# Patient Record
Sex: FEMALE | Race: OTHER | Employment: UNEMPLOYED | URBAN - METROPOLITAN AREA
[De-identification: names, ages, dates, MRNs, and addresses within clinical notes are randomized per-mention and may not be internally consistent; named-entity substitution may affect disease eponyms.]

---

## 2024-01-30 ENCOUNTER — OFFICE VISIT (OUTPATIENT)
Dept: OBGYN CLINIC | Age: 24
End: 2024-01-30
Payer: COMMERCIAL

## 2024-01-30 VITALS — DIASTOLIC BLOOD PRESSURE: 80 MMHG | WEIGHT: 144 LBS | SYSTOLIC BLOOD PRESSURE: 104 MMHG

## 2024-01-30 DIAGNOSIS — R93.5 ABNORMAL CT SCAN, PELVIS: ICD-10-CM

## 2024-01-30 DIAGNOSIS — R19.00 PELVIC MASS: ICD-10-CM

## 2024-01-30 DIAGNOSIS — R10.2 PELVIC PAIN: Primary | ICD-10-CM

## 2024-01-30 PROBLEM — D21.9 FIBROID: Status: ACTIVE | Noted: 2024-02-08

## 2024-01-30 PROCEDURE — G8421 BMI NOT CALCULATED: HCPCS | Performed by: OBSTETRICS & GYNECOLOGY

## 2024-01-30 PROCEDURE — 1036F TOBACCO NON-USER: CPT | Performed by: OBSTETRICS & GYNECOLOGY

## 2024-01-30 PROCEDURE — G8427 DOCREV CUR MEDS BY ELIG CLIN: HCPCS | Performed by: OBSTETRICS & GYNECOLOGY

## 2024-01-30 PROCEDURE — G8484 FLU IMMUNIZE NO ADMIN: HCPCS | Performed by: OBSTETRICS & GYNECOLOGY

## 2024-01-30 PROCEDURE — 76830 TRANSVAGINAL US NON-OB: CPT | Performed by: OBSTETRICS & GYNECOLOGY

## 2024-01-30 PROCEDURE — 99203 OFFICE O/P NEW LOW 30 MIN: CPT | Performed by: OBSTETRICS & GYNECOLOGY

## 2024-01-30 NOTE — PROGRESS NOTES
MARY Jackson is a 23 y.o. female seen for annual GYN exam.    Past Medical History, Past Surgical History, Family history, Social History, and Medications were all reviewed with the patient today and updated as necessary.     No current outpatient medications on file.     No current facility-administered medications for this visit.     Not on File  No past medical history on file.  No past surgical history on file.  No family history on file.   Social History     Tobacco Use    Smoking status: Not on file    Smokeless tobacco: Not on file   Substance Use Topics    Alcohol use: Not on file       Social History     Substance and Sexual Activity   Sexual Activity Not on file     OB History   No obstetric history on file.       Health Maintenance    Pap smear:      Review of Systems  General: Not Present- Chills, Fever, Fatigue, Insomnia, Hot flashes/Night sweats, Weight gain  Skin: Not Present- Bruising, Change in Wart/Mole, Excessive Sweating, Itching, Nail Changes, New Lesions, Rash, Skin Color Changes and Ulcer.  HEENT: Not Present- Headache, Blurred Vision, Double Vision, Glaucoma, Visual Disturbances, Hearing Loss, Ringing in the Ears, Vertigo, Nose Bleed, Bleeding Gums, Hoarseness and Sore Throat.  Neck: Not Present- Neck Pain and Neck Swelling.  Respiratory: Not Present- Cough, Difficulty Breathing and Difficulty Breathing on Exertion.  Breast: Not Present- Breast Mass, Breast Pain, Breast Swelling, Nipple Discharge, Nipple Pain, Recent Breast Size Changes and Skin Changes.  Cardiovascular: Not Present- Abnormal Blood Pressure, Chest Pain, Edema, Fainting / Blacking Out, Palpitations, Shortness of Breath and Swelling of Extremities.  Gastrointestinal: Not Present- Abdominal Pain, Abdominal Swelling, Bloating, Change in Bowel Habits, Constipation, Diarrhea, Difficulty Swallowing, Gets full quickly at meals, Nausea, Rectal Bleeding and Vomiting.  Female Genitourinary: Not Present- Dysmenorrhea, 
septations noted. The cyst measures 10.8/8.2/10.3cm.   There is blood flow noted to the ovarian tissue. Possible endometrioma vs dermoid vs mucinous cystadenoma.   Lt Ovary appears normal   No free fluid seen     I reviewed the images of the US done in my office and discussed with patient.  She lives in [place] where she was going to school but is currently staying with her sister who is a hospitalist - usually downtown.  She has to go back to [place] in 2 weeks and wants her sister to have the surgery here.  Discussed laparoscopy.  She wishes to proceed        Time:  I spent  30 minutes in preparing to see patient (including chart review and preparation), obtaining and/or reviewing additional medical history, performing a physical exam and evaluation, documenting clinical information in the electronic health record, independently interpreting results, communicating results to patient, family or caregiver, and/or coordinating care.          No follow-ups on file.       KASI ROUSE MD

## 2024-01-31 NOTE — PROGRESS NOTES
Preop Visit    Ms. Jack Jackson presents for a preop visit.  She is scheduled for a Laparoscopy Diagnostic on 02/08/2024. Her history, meds, and allergies were reviewed.  The procedure was reviewed in detail as well as the risks of bleeding, infection, DVT and potential surgical complications involving the bladder, ureters, colon or intestines.  Also the alternatives,  benefits, recovery and follow-up. Prevention of SSI discussed as indicated.  All of her questions were answered.    Discussed with her and her sister possibility of extensive endometriosis.  May not be able to completely excise all of it.  Discussed possibility of her having to remove the ovary but will try to maintain as much ovarian tissue as possible.  Discussed possibility of needing postoperative medical therapy for endometriosis.  Discussed possible risk of having to perform an open laparotomy    Exam:  Lungs CTAB  Heart RRR    See the hospital H&P as well as prior chart for details.  Prescriptions for ibuprofen and Roxicodone sent to the pharmacy.  
0

## 2024-02-01 ENCOUNTER — OFFICE VISIT (OUTPATIENT)
Dept: OBGYN CLINIC | Age: 24
End: 2024-02-01

## 2024-02-01 VITALS — WEIGHT: 142.7 LBS | BODY MASS INDEX: 23.78 KG/M2 | HEIGHT: 65 IN

## 2024-02-01 DIAGNOSIS — Z01.818 PRE-OP EXAMINATION: Primary | ICD-10-CM

## 2024-02-01 DIAGNOSIS — R10.2 PELVIC PAIN IN FEMALE: ICD-10-CM

## 2024-02-01 DIAGNOSIS — R19.00 PELVIC MASS: ICD-10-CM

## 2024-02-01 DIAGNOSIS — N83.8 OVARIAN MASS: ICD-10-CM

## 2024-02-01 DIAGNOSIS — R10.2 PELVIC PAIN: ICD-10-CM

## 2024-02-01 DIAGNOSIS — R93.5 ABNORMAL CT SCAN, PELVIS: ICD-10-CM

## 2024-02-01 RX ORDER — SODIUM CHLORIDE 0.9 % (FLUSH) 0.9 %
5-40 SYRINGE (ML) INJECTION PRN
Status: CANCELLED | OUTPATIENT
Start: 2024-02-01

## 2024-02-01 RX ORDER — SODIUM CHLORIDE 0.9 % (FLUSH) 0.9 %
5-40 SYRINGE (ML) INJECTION EVERY 12 HOURS SCHEDULED
Status: CANCELLED | OUTPATIENT
Start: 2024-02-01

## 2024-02-01 RX ORDER — IBUPROFEN 600 MG/1
600 TABLET ORAL 3 TIMES DAILY PRN
Qty: 30 TABLET | Refills: 0 | Status: SHIPPED | OUTPATIENT
Start: 2024-02-01

## 2024-02-01 RX ORDER — OXYCODONE HYDROCHLORIDE 5 MG/1
5 TABLET ORAL EVERY 6 HOURS PRN
Qty: 20 TABLET | Refills: 0 | Status: SHIPPED | OUTPATIENT
Start: 2024-02-01 | End: 2024-02-06

## 2024-02-01 RX ORDER — SODIUM CHLORIDE 9 MG/ML
INJECTION, SOLUTION INTRAVENOUS PRN
Status: CANCELLED | OUTPATIENT
Start: 2024-02-01

## 2024-02-01 NOTE — H&P
Gynecology History and Physical    Name: Jack Jackson MRN: [id number] [id number]: [id number]    YOB: 2000  Age: 23 y.o.  Sex: female       Subjective:      Chief complaint:  Right Ovarian cyst     Jack is a 23 y.o. female with a history of dysmenorrhea, right ovarian cyst , and pelvic pain.    Uterus appears normal   Endo = 3.8mm and appears normal   Rt Ovary has large complex debris filled cyst with several small septations noted. The cyst measures 10.8/8.2/10.3cm.   There is blood flow noted to the ovarian tissue. Possible endometrioma vs dermoid vs mucinous cystadenoma.   Lt Ovary appears normal   No free fluid seen     OB History          0    Para        Term                AB        Living             SAB        IAB        Ectopic        Molar        Multiple        Live Births                  No past medical history on file.  No past surgical history on file.  Social History     Occupational History    Not on file   Tobacco Use    Smoking status: Former     Current packs/day: 0.00     Types: Cigarettes    Smokeless tobacco: Never   Substance and Sexual Activity    Alcohol use: Yes    Drug use: Never    Sexual activity: Not Currently     Partners: Male     Birth control/protection: None     Family History   Problem Relation Age of Onset    No Known Problems Mother     No Known Problems Father         No Known Allergies  Prior to Admission medications    Medication Sig Start Date End Date Taking? Authorizing Provider   ibuprofen (ADVIL;MOTRIN) 600 MG tablet Take 1 tablet by mouth 3 times daily as needed for Pain 24   Markus Orlando MD   oxyCODONE (ROXICODONE) 5 MG immediate release tablet Take 1 tablet by mouth every 6 hours as needed for Pain for up to 5 days. Intended supply: 5 days. Take lowest dose possible to manage pain Max Daily Amount: 20 mg 24  Markus Orlando MD        Review of Systems: A comprehensive review of systems was negative except for that

## 2024-02-01 NOTE — H&P (VIEW-ONLY)
Gynecology History and Physical    Name: Jack Jackson MRN: [id number] SSN: [id number]    YOB: 2000  Age: 23 y.o.  Sex: female       Subjective:      Chief complaint:  Right Ovarian cyst     Jack is a 23 y.o. female with a history of dysmenorrhea, right ovarian cyst , and pelvic pain.    Uterus appears normal   Endo = 3.8mm and appears normal   Rt Ovary has large complex debris filled cyst with several small septations noted. The cyst measures 10.8/8.2/10.3cm.   There is blood flow noted to the ovarian tissue. Possible endometrioma vs dermoid vs mucinous cystadenoma.   Lt Ovary appears normal   No free fluid seen     OB History          0    Para        Term                AB        Living             SAB        IAB        Ectopic        Molar        Multiple        Live Births                  No past medical history on file.  No past surgical history on file.  Social History     Occupational History    Not on file   Tobacco Use    Smoking status: Former     Current packs/day: 0.00     Types: Cigarettes    Smokeless tobacco: Never   Substance and Sexual Activity    Alcohol use: Yes    Drug use: Never    Sexual activity: Not Currently     Partners: Male     Birth control/protection: None     Family History   Problem Relation Age of Onset    No Known Problems Mother     No Known Problems Father         No Known Allergies  Prior to Admission medications    Medication Sig Start Date End Date Taking? Authorizing Provider   ibuprofen (ADVIL;MOTRIN) 600 MG tablet Take 1 tablet by mouth 3 times daily as needed for Pain 24   Markus Orlando MD   oxyCODONE (ROXICODONE) 5 MG immediate release tablet Take 1 tablet by mouth every 6 hours as needed for Pain for up to 5 days. Intended supply: 5 days. Take lowest dose possible to manage pain Max Daily Amount: 20 mg 24  Markus Orlando MD        Review of Systems: A comprehensive review of systems was negative except for that

## 2024-02-07 ENCOUNTER — ANESTHESIA EVENT (OUTPATIENT)
Dept: SURGERY | Age: 24
End: 2024-02-07
Payer: COMMERCIAL

## 2024-02-07 NOTE — PERIOP NOTE
Patient verified name and .  Order for consent was found in EHR and matches case posting; patient verifies procedure.   Type 2 surgery, PAT phone assessment complete.  Orders were received.  Labs per surgeon: None  Labs per anesthesia protocol: Hgb to be drawn DOS    Patient answered medical/surgical history questions at their best of ability. All prior to admission medications documented in EPIC.  Patient instructed to take the following medications the day of surgery according to anesthesia guidelines with a small sip of water: None On the day before surgery please take 2 Tylenol in the morning and then again before bed. You may use either regular or extra strength.   Hold all vitamins 7 days prior to surgery and NSAIDS 5 days prior to surgery. Prescription meds to hold:Ibuprofen  Patient instructed on the following:    > Arrive at A Entrance, time of arrival to be called the day before by 1700  > NPO after midnight, unless otherwise indicated, including gum, mints, and ice chips  > Responsible adult must drive patient to the hospital, stay during surgery, and patient will need supervision 24 hours after anesthesia  > Use non moisturizing soap in shower the night before surgery and on the morning of surgery  > All piercings must be removed prior to arrival.    > Leave all valuables (money and jewelry) at home but bring insurance card and ID on DOS.     > Do not wear make-up, nail polish, lotions, cologne, perfumes, powders, or oil on skin. Artificial nails are not permitted.

## 2024-02-08 ENCOUNTER — HOSPITAL ENCOUNTER (INPATIENT)
Age: 24
LOS: 4 days | Discharge: HOME | DRG: 743 | End: 2024-02-12
Attending: OBSTETRICS & GYNECOLOGY | Admitting: OBSTETRICS & GYNECOLOGY
Payer: COMMERCIAL

## 2024-02-08 ENCOUNTER — PREP FOR PROCEDURE (OUTPATIENT)
Dept: OBGYN CLINIC | Age: 24
End: 2024-02-08

## 2024-02-08 ENCOUNTER — ANESTHESIA (OUTPATIENT)
Dept: SURGERY | Age: 24
End: 2024-02-08
Payer: COMMERCIAL

## 2024-02-08 DIAGNOSIS — R10.2 PELVIC PAIN IN FEMALE: ICD-10-CM

## 2024-02-08 DIAGNOSIS — D21.9 FIBROID: ICD-10-CM

## 2024-02-08 PROBLEM — R19.00 PELVIC MASS: Status: ACTIVE | Noted: 2024-02-08

## 2024-02-08 LAB
HCG UR QL: NEGATIVE
HGB BLD-MCNC: 13.8 G/DL (ref 11.7–15.4)

## 2024-02-08 PROCEDURE — 3600000014 HC SURGERY LEVEL 4 ADDTL 15MIN: Performed by: OBSTETRICS & GYNECOLOGY

## 2024-02-08 PROCEDURE — 81025 URINE PREGNANCY TEST: CPT

## 2024-02-08 PROCEDURE — 87077 CULTURE AEROBIC IDENTIFY: CPT

## 2024-02-08 PROCEDURE — 6360000002 HC RX W HCPCS: Performed by: REGISTERED NURSE

## 2024-02-08 PROCEDURE — 88305 TISSUE EXAM BY PATHOLOGIST: CPT

## 2024-02-08 PROCEDURE — 2709999900 HC NON-CHARGEABLE SUPPLY: Performed by: OBSTETRICS & GYNECOLOGY

## 2024-02-08 PROCEDURE — 2720000010 HC SURG SUPPLY STERILE: Performed by: OBSTETRICS & GYNECOLOGY

## 2024-02-08 PROCEDURE — 6370000000 HC RX 637 (ALT 250 FOR IP): Performed by: OBSTETRICS & GYNECOLOGY

## 2024-02-08 PROCEDURE — 6360000002 HC RX W HCPCS: Performed by: ANESTHESIOLOGY

## 2024-02-08 PROCEDURE — 3600000004 HC SURGERY LEVEL 4 BASE: Performed by: OBSTETRICS & GYNECOLOGY

## 2024-02-08 PROCEDURE — 7100000000 HC PACU RECOVERY - FIRST 15 MIN: Performed by: OBSTETRICS & GYNECOLOGY

## 2024-02-08 PROCEDURE — 0UT50ZZ RESECTION OF RIGHT FALLOPIAN TUBE, OPEN APPROACH: ICD-10-PCS | Performed by: OBSTETRICS & GYNECOLOGY

## 2024-02-08 PROCEDURE — 88302 TISSUE EXAM BY PATHOLOGIST: CPT

## 2024-02-08 PROCEDURE — 87184 SC STD DISK METHOD PER PLATE: CPT

## 2024-02-08 PROCEDURE — 0UJ34ZZ INSPECTION OF OVARY, PERCUTANEOUS ENDOSCOPIC APPROACH: ICD-10-PCS | Performed by: OBSTETRICS & GYNECOLOGY

## 2024-02-08 PROCEDURE — 2500000003 HC RX 250 WO HCPCS: Performed by: REGISTERED NURSE

## 2024-02-08 PROCEDURE — 2580000003 HC RX 258: Performed by: OBSTETRICS & GYNECOLOGY

## 2024-02-08 PROCEDURE — 6370000000 HC RX 637 (ALT 250 FOR IP): Performed by: ANESTHESIOLOGY

## 2024-02-08 PROCEDURE — 1100000000 HC RM PRIVATE

## 2024-02-08 PROCEDURE — 87075 CULTR BACTERIA EXCEPT BLOOD: CPT

## 2024-02-08 PROCEDURE — 7100000001 HC PACU RECOVERY - ADDTL 15 MIN: Performed by: OBSTETRICS & GYNECOLOGY

## 2024-02-08 PROCEDURE — 6360000002 HC RX W HCPCS: Performed by: OBSTETRICS & GYNECOLOGY

## 2024-02-08 PROCEDURE — 49205 PR EXC/DESTRUCTION OPEN ABDOMINAL TUMORS >10.0 CM: CPT | Performed by: OBSTETRICS & GYNECOLOGY

## 2024-02-08 PROCEDURE — 2580000003 HC RX 258: Performed by: ANESTHESIOLOGY

## 2024-02-08 PROCEDURE — 87205 SMEAR GRAM STAIN: CPT

## 2024-02-08 PROCEDURE — 85018 HEMOGLOBIN: CPT

## 2024-02-08 PROCEDURE — 3700000001 HC ADD 15 MINUTES (ANESTHESIA): Performed by: OBSTETRICS & GYNECOLOGY

## 2024-02-08 PROCEDURE — 87070 CULTURE OTHR SPECIMN AEROBIC: CPT

## 2024-02-08 PROCEDURE — 3700000000 HC ANESTHESIA ATTENDED CARE: Performed by: OBSTETRICS & GYNECOLOGY

## 2024-02-08 PROCEDURE — 0UT00ZZ RESECTION OF RIGHT OVARY, OPEN APPROACH: ICD-10-PCS | Performed by: OBSTETRICS & GYNECOLOGY

## 2024-02-08 PROCEDURE — 64488 TAP BLOCK BI INJECTION: CPT | Performed by: ANESTHESIOLOGY

## 2024-02-08 RX ORDER — ONDANSETRON 2 MG/ML
INJECTION INTRAMUSCULAR; INTRAVENOUS PRN
Status: DISCONTINUED | OUTPATIENT
Start: 2024-02-08 | End: 2024-02-08 | Stop reason: SDUPTHER

## 2024-02-08 RX ORDER — HYDROMORPHONE HYDROCHLORIDE 2 MG/ML
INJECTION, SOLUTION INTRAMUSCULAR; INTRAVENOUS; SUBCUTANEOUS PRN
Status: DISCONTINUED | OUTPATIENT
Start: 2024-02-08 | End: 2024-02-08 | Stop reason: SDUPTHER

## 2024-02-08 RX ORDER — ONDANSETRON 2 MG/ML
4 INJECTION INTRAMUSCULAR; INTRAVENOUS EVERY 6 HOURS PRN
Status: DISCONTINUED | OUTPATIENT
Start: 2024-02-08 | End: 2024-02-12 | Stop reason: HOSPADM

## 2024-02-08 RX ORDER — DEXAMETHASONE SODIUM PHOSPHATE 10 MG/ML
INJECTION INTRAMUSCULAR; INTRAVENOUS PRN
Status: DISCONTINUED | OUTPATIENT
Start: 2024-02-08 | End: 2024-02-08 | Stop reason: SDUPTHER

## 2024-02-08 RX ORDER — ROPIVACAINE HYDROCHLORIDE 5 MG/ML
INJECTION, SOLUTION EPIDURAL; INFILTRATION; PERINEURAL
Status: DISCONTINUED | OUTPATIENT
Start: 2024-02-08 | End: 2024-02-08 | Stop reason: SDUPTHER

## 2024-02-08 RX ORDER — OXYCODONE HYDROCHLORIDE 5 MG/1
10 TABLET ORAL EVERY 4 HOURS PRN
Status: DISCONTINUED | OUTPATIENT
Start: 2024-02-08 | End: 2024-02-12 | Stop reason: HOSPADM

## 2024-02-08 RX ORDER — SODIUM CHLORIDE 0.9 % (FLUSH) 0.9 %
5-40 SYRINGE (ML) INJECTION EVERY 12 HOURS SCHEDULED
Status: DISCONTINUED | OUTPATIENT
Start: 2024-02-08 | End: 2024-02-12 | Stop reason: HOSPADM

## 2024-02-08 RX ORDER — DIPHENHYDRAMINE HYDROCHLORIDE 50 MG/ML
12.5 INJECTION INTRAMUSCULAR; INTRAVENOUS
Status: DISCONTINUED | OUTPATIENT
Start: 2024-02-08 | End: 2024-02-08 | Stop reason: HOSPADM

## 2024-02-08 RX ORDER — LIDOCAINE HYDROCHLORIDE 20 MG/ML
INJECTION, SOLUTION EPIDURAL; INFILTRATION; INTRACAUDAL; PERINEURAL PRN
Status: DISCONTINUED | OUTPATIENT
Start: 2024-02-08 | End: 2024-02-08 | Stop reason: SDUPTHER

## 2024-02-08 RX ORDER — IBUPROFEN 800 MG/1
800 TABLET ORAL EVERY 8 HOURS SCHEDULED
Status: DISCONTINUED | OUTPATIENT
Start: 2024-02-09 | End: 2024-02-12 | Stop reason: HOSPADM

## 2024-02-08 RX ORDER — HYDROMORPHONE HYDROCHLORIDE 1 MG/ML
0.5 INJECTION, SOLUTION INTRAMUSCULAR; INTRAVENOUS; SUBCUTANEOUS EVERY 5 MIN PRN
Status: DISCONTINUED | OUTPATIENT
Start: 2024-02-08 | End: 2024-02-08 | Stop reason: HOSPADM

## 2024-02-08 RX ORDER — OXYCODONE HYDROCHLORIDE 5 MG/1
5 TABLET ORAL
Status: COMPLETED | OUTPATIENT
Start: 2024-02-08 | End: 2024-02-08

## 2024-02-08 RX ORDER — SODIUM CHLORIDE 9 MG/ML
INJECTION, SOLUTION INTRAVENOUS PRN
Status: DISCONTINUED | OUTPATIENT
Start: 2024-02-08 | End: 2024-02-12 | Stop reason: HOSPADM

## 2024-02-08 RX ORDER — MIDAZOLAM HYDROCHLORIDE 2 MG/2ML
2 INJECTION, SOLUTION INTRAMUSCULAR; INTRAVENOUS
Status: COMPLETED | OUTPATIENT
Start: 2024-02-08 | End: 2024-02-08

## 2024-02-08 RX ORDER — KETOROLAC TROMETHAMINE 30 MG/ML
30 INJECTION, SOLUTION INTRAMUSCULAR; INTRAVENOUS EVERY 6 HOURS
Status: COMPLETED | OUTPATIENT
Start: 2024-02-08 | End: 2024-02-09

## 2024-02-08 RX ORDER — SODIUM CHLORIDE 0.9 % (FLUSH) 0.9 %
5-40 SYRINGE (ML) INJECTION PRN
Status: DISCONTINUED | OUTPATIENT
Start: 2024-02-08 | End: 2024-02-08 | Stop reason: HOSPADM

## 2024-02-08 RX ORDER — GLYCOPYRROLATE 0.2 MG/ML
INJECTION INTRAMUSCULAR; INTRAVENOUS PRN
Status: DISCONTINUED | OUTPATIENT
Start: 2024-02-08 | End: 2024-02-08 | Stop reason: SDUPTHER

## 2024-02-08 RX ORDER — PROMETHAZINE HYDROCHLORIDE 25 MG/1
12.5 TABLET ORAL EVERY 6 HOURS PRN
Status: DISCONTINUED | OUTPATIENT
Start: 2024-02-08 | End: 2024-02-12 | Stop reason: HOSPADM

## 2024-02-08 RX ORDER — IBUPROFEN 800 MG/1
800 TABLET ORAL EVERY 8 HOURS SCHEDULED
Status: DISCONTINUED | OUTPATIENT
Start: 2024-02-08 | End: 2024-02-08

## 2024-02-08 RX ORDER — FENTANYL CITRATE 50 UG/ML
100 INJECTION, SOLUTION INTRAMUSCULAR; INTRAVENOUS
Status: DISCONTINUED | OUTPATIENT
Start: 2024-02-08 | End: 2024-02-08 | Stop reason: HOSPADM

## 2024-02-08 RX ORDER — BUPIVACAINE HYDROCHLORIDE 5 MG/ML
INJECTION, SOLUTION EPIDURAL; INTRACAUDAL PRN
Status: DISCONTINUED | OUTPATIENT
Start: 2024-02-08 | End: 2024-02-08 | Stop reason: ALTCHOICE

## 2024-02-08 RX ORDER — OXYCODONE HYDROCHLORIDE 5 MG/1
5 TABLET ORAL EVERY 4 HOURS PRN
Status: DISCONTINUED | OUTPATIENT
Start: 2024-02-08 | End: 2024-02-12 | Stop reason: HOSPADM

## 2024-02-08 RX ORDER — ONDANSETRON 2 MG/ML
4 INJECTION INTRAMUSCULAR; INTRAVENOUS
Status: COMPLETED | OUTPATIENT
Start: 2024-02-08 | End: 2024-02-08

## 2024-02-08 RX ORDER — SODIUM CHLORIDE 0.9 % (FLUSH) 0.9 %
5-40 SYRINGE (ML) INJECTION PRN
Status: DISCONTINUED | OUTPATIENT
Start: 2024-02-08 | End: 2024-02-12 | Stop reason: HOSPADM

## 2024-02-08 RX ORDER — ACETAMINOPHEN 500 MG
1000 TABLET ORAL ONCE
Status: COMPLETED | OUTPATIENT
Start: 2024-02-08 | End: 2024-02-08

## 2024-02-08 RX ORDER — DEXTROSE, SODIUM CHLORIDE, SODIUM LACTATE, POTASSIUM CHLORIDE, AND CALCIUM CHLORIDE 5; .6; .31; .03; .02 G/100ML; G/100ML; G/100ML; G/100ML; G/100ML
INJECTION, SOLUTION INTRAVENOUS CONTINUOUS
Status: DISCONTINUED | OUTPATIENT
Start: 2024-02-08 | End: 2024-02-09

## 2024-02-08 RX ORDER — MORPHINE SULFATE 4 MG/ML
4 INJECTION, SOLUTION INTRAMUSCULAR; INTRAVENOUS
Status: DISCONTINUED | OUTPATIENT
Start: 2024-02-08 | End: 2024-02-12 | Stop reason: HOSPADM

## 2024-02-08 RX ORDER — SODIUM CHLORIDE 0.9 % (FLUSH) 0.9 %
5-40 SYRINGE (ML) INJECTION EVERY 12 HOURS SCHEDULED
Status: DISCONTINUED | OUTPATIENT
Start: 2024-02-08 | End: 2024-02-08 | Stop reason: HOSPADM

## 2024-02-08 RX ORDER — KETAMINE HCL IN NACL, ISO-OSM 20 MG/2 ML
SYRINGE (ML) INJECTION PRN
Status: DISCONTINUED | OUTPATIENT
Start: 2024-02-08 | End: 2024-02-08 | Stop reason: SDUPTHER

## 2024-02-08 RX ORDER — SODIUM CHLORIDE 9 MG/ML
INJECTION, SOLUTION INTRAVENOUS PRN
Status: DISCONTINUED | OUTPATIENT
Start: 2024-02-08 | End: 2024-02-08 | Stop reason: HOSPADM

## 2024-02-08 RX ORDER — ACETAMINOPHEN 500 MG
1000 TABLET ORAL EVERY 8 HOURS PRN
Status: DISCONTINUED | OUTPATIENT
Start: 2024-02-08 | End: 2024-02-12 | Stop reason: HOSPADM

## 2024-02-08 RX ORDER — FENTANYL CITRATE 50 UG/ML
INJECTION, SOLUTION INTRAMUSCULAR; INTRAVENOUS PRN
Status: DISCONTINUED | OUTPATIENT
Start: 2024-02-08 | End: 2024-02-08 | Stop reason: SDUPTHER

## 2024-02-08 RX ORDER — NEOSTIGMINE METHYLSULFATE 1 MG/ML
INJECTION, SOLUTION INTRAVENOUS PRN
Status: DISCONTINUED | OUTPATIENT
Start: 2024-02-08 | End: 2024-02-08 | Stop reason: SDUPTHER

## 2024-02-08 RX ORDER — DEXAMETHASONE SODIUM PHOSPHATE 10 MG/ML
INJECTION, SOLUTION INTRAMUSCULAR; INTRAVENOUS
Status: DISCONTINUED | OUTPATIENT
Start: 2024-02-08 | End: 2024-02-08 | Stop reason: SDUPTHER

## 2024-02-08 RX ORDER — KETOROLAC TROMETHAMINE 30 MG/ML
INJECTION, SOLUTION INTRAMUSCULAR; INTRAVENOUS PRN
Status: DISCONTINUED | OUTPATIENT
Start: 2024-02-08 | End: 2024-02-08 | Stop reason: SDUPTHER

## 2024-02-08 RX ORDER — MORPHINE SULFATE 2 MG/ML
2 INJECTION, SOLUTION INTRAMUSCULAR; INTRAVENOUS
Status: DISCONTINUED | OUTPATIENT
Start: 2024-02-08 | End: 2024-02-12 | Stop reason: HOSPADM

## 2024-02-08 RX ORDER — ROCURONIUM BROMIDE 10 MG/ML
INJECTION, SOLUTION INTRAVENOUS PRN
Status: DISCONTINUED | OUTPATIENT
Start: 2024-02-08 | End: 2024-02-08 | Stop reason: SDUPTHER

## 2024-02-08 RX ORDER — PROPOFOL 10 MG/ML
INJECTION, EMULSION INTRAVENOUS PRN
Status: DISCONTINUED | OUTPATIENT
Start: 2024-02-08 | End: 2024-02-08 | Stop reason: SDUPTHER

## 2024-02-08 RX ORDER — SODIUM CHLORIDE, SODIUM LACTATE, POTASSIUM CHLORIDE, CALCIUM CHLORIDE 600; 310; 30; 20 MG/100ML; MG/100ML; MG/100ML; MG/100ML
INJECTION, SOLUTION INTRAVENOUS CONTINUOUS
Status: DISCONTINUED | OUTPATIENT
Start: 2024-02-08 | End: 2024-02-08 | Stop reason: HOSPADM

## 2024-02-08 RX ADMIN — DEXAMETHASONE SODIUM PHOSPHATE 10 MG: 10 INJECTION INTRAMUSCULAR; INTRAVENOUS at 07:14

## 2024-02-08 RX ADMIN — Medication 3 MG: at 09:39

## 2024-02-08 RX ADMIN — Medication 20 MG: at 07:22

## 2024-02-08 RX ADMIN — ONDANSETRON 4 MG: 2 INJECTION INTRAMUSCULAR; INTRAVENOUS at 07:14

## 2024-02-08 RX ADMIN — FENTANYL CITRATE 100 MCG: 50 INJECTION, SOLUTION INTRAMUSCULAR; INTRAVENOUS at 07:05

## 2024-02-08 RX ADMIN — HYDROMORPHONE HYDROCHLORIDE 1 MG: 2 INJECTION INTRAMUSCULAR; INTRAVENOUS; SUBCUTANEOUS at 09:57

## 2024-02-08 RX ADMIN — LIDOCAINE HYDROCHLORIDE 60 MG: 20 INJECTION, SOLUTION EPIDURAL; INFILTRATION; INTRACAUDAL; PERINEURAL at 07:08

## 2024-02-08 RX ADMIN — HYDROMORPHONE HYDROCHLORIDE 0.5 MG: 1 INJECTION, SOLUTION INTRAMUSCULAR; INTRAVENOUS; SUBCUTANEOUS at 10:16

## 2024-02-08 RX ADMIN — KETOROLAC TROMETHAMINE 30 MG: 30 INJECTION, SOLUTION INTRAMUSCULAR; INTRAVENOUS at 22:56

## 2024-02-08 RX ADMIN — SODIUM CHLORIDE, SODIUM LACTATE, POTASSIUM CHLORIDE, AND CALCIUM CHLORIDE: 600; 310; 30; 20 INJECTION, SOLUTION INTRAVENOUS at 08:05

## 2024-02-08 RX ADMIN — DEXAMETHASONE SODIUM PHOSPHATE 10 MG: 10 INJECTION, SOLUTION INTRAMUSCULAR; INTRAVENOUS at 09:41

## 2024-02-08 RX ADMIN — PROPOFOL 200 MG: 10 INJECTION, EMULSION INTRAVENOUS at 07:08

## 2024-02-08 RX ADMIN — ROCURONIUM BROMIDE 50 MG: 50 INJECTION, SOLUTION INTRAVENOUS at 07:08

## 2024-02-08 RX ADMIN — MIDAZOLAM HYDROCHLORIDE 2 MG: 1 INJECTION, SOLUTION INTRAMUSCULAR; INTRAVENOUS at 06:53

## 2024-02-08 RX ADMIN — Medication 2000 MG: at 07:12

## 2024-02-08 RX ADMIN — HYDROMORPHONE HYDROCHLORIDE 0.5 MG: 1 INJECTION, SOLUTION INTRAMUSCULAR; INTRAVENOUS; SUBCUTANEOUS at 10:11

## 2024-02-08 RX ADMIN — KETOROLAC TROMETHAMINE 30 MG: 30 INJECTION, SOLUTION INTRAMUSCULAR; INTRAVENOUS at 09:39

## 2024-02-08 RX ADMIN — SODIUM CHLORIDE, SODIUM LACTATE, POTASSIUM CHLORIDE, CALCIUM CHLORIDE AND DEXTROSE MONOHYDRATE: 5; 600; 310; 30; 20 INJECTION, SOLUTION INTRAVENOUS at 20:19

## 2024-02-08 RX ADMIN — ROPIVACAINE HYDROCHLORIDE 30 ML: 5 INJECTION, SOLUTION EPIDURAL; INFILTRATION; PERINEURAL at 09:41

## 2024-02-08 RX ADMIN — KETOROLAC TROMETHAMINE 30 MG: 30 INJECTION, SOLUTION INTRAMUSCULAR; INTRAVENOUS at 15:40

## 2024-02-08 RX ADMIN — HYDROMORPHONE HYDROCHLORIDE 1 MG: 2 INJECTION INTRAMUSCULAR; INTRAVENOUS; SUBCUTANEOUS at 08:03

## 2024-02-08 RX ADMIN — ONDANSETRON 4 MG: 2 INJECTION INTRAMUSCULAR; INTRAVENOUS at 10:30

## 2024-02-08 RX ADMIN — OXYCODONE 5 MG: 5 TABLET ORAL at 10:30

## 2024-02-08 RX ADMIN — ACETAMINOPHEN 1000 MG: 500 TABLET, FILM COATED ORAL at 06:26

## 2024-02-08 RX ADMIN — OXYCODONE 5 MG: 5 TABLET ORAL at 14:01

## 2024-02-08 RX ADMIN — CEFAZOLIN 2000 MG: 10 INJECTION, POWDER, FOR SOLUTION INTRAVENOUS at 12:30

## 2024-02-08 RX ADMIN — SODIUM CHLORIDE, SODIUM LACTATE, POTASSIUM CHLORIDE, CALCIUM CHLORIDE AND DEXTROSE MONOHYDRATE: 5; 600; 310; 30; 20 INJECTION, SOLUTION INTRAVENOUS at 11:31

## 2024-02-08 RX ADMIN — SODIUM CHLORIDE, SODIUM LACTATE, POTASSIUM CHLORIDE, AND CALCIUM CHLORIDE: 600; 310; 30; 20 INJECTION, SOLUTION INTRAVENOUS at 06:53

## 2024-02-08 RX ADMIN — OXYCODONE 5 MG: 5 TABLET ORAL at 20:22

## 2024-02-08 RX ADMIN — GLYCOPYRROLATE 0.4 MG: 0.2 INJECTION INTRAMUSCULAR; INTRAVENOUS at 09:39

## 2024-02-08 NOTE — PERIOP NOTE
MD Michelle at bedside with patient. Pt VSS stable. Pain and Nausea controlled at this time. Verbal sign out per MD when pacu care is completed. Plan of care continues.

## 2024-02-08 NOTE — PROGRESS NOTES
Gynecology Progress Note    Patient doing well post-op day 0 from laparotomy with right salpingo-oophorectomy of the pelvic mass without significant complaints.  Pain controlled on current medication.  Voiding without difficulty.     Vitals:  Blood pressure 124/72, pulse (!) 111, temperature 98 °F (36.7 °C), temperature source Oral, resp. rate 16, height 1.651 m (5' 5\"), weight 65.9 kg (145 lb 3.2 oz), last menstrual period 2024, SpO2 97 %, not currently breastfeeding.  Temp (24hrs), Av.3 °F (36.8 °C), Min:97.3 °F (36.3 °C), Max:99.3 °F (37.4 °C)        Exam:  Patient without distress.               Abdomen soft,                 Incision dressing dry and clean               Lower extremities are negative for swelling, cords, or tenderness.    Lab/Data Review:      Assessment and Plan:  Patient appears to be having uncomplicated post OP course.  Continue routine post-op care.  Discussed findings and events of the surgery.  All of her questions were answered.  Patient feels that she is doing well.

## 2024-02-08 NOTE — ANESTHESIA PRE PROCEDURE
Applicable):  No results found for: \"HIV\", \"HEPCAB\"    COVID-19 Screening (If Applicable): No results found for: \"COVID19\"        Anesthesia Evaluation  Patient summary reviewed and Nursing notes reviewed  Airway: Mallampati: II  TM distance: >3 FB   Neck ROM: full  Mouth opening: > = 3 FB   Dental: normal exam         Pulmonary:Negative Pulmonary ROS and normal exam  breath sounds clear to auscultation                             Cardiovascular:Negative CV ROS  Exercise tolerance: good (>4 METS)          Rhythm: regular  Rate: normal                    Neuro/Psych:   Negative Neuro/Psych ROS              GI/Hepatic/Renal: Neg GI/Hepatic/Renal ROS            Endo/Other: Negative Endo/Other ROS                    Abdominal:             Vascular: negative vascular ROS.         Other Findings:       Anesthesia Plan      general     ASA 2       Induction: intravenous.      Anesthetic plan and risks discussed with patient.                    Ld Rodrigues MD   2/8/2024

## 2024-02-08 NOTE — PERIOP NOTE
TRANSFER - OUT REPORT:    Verbal report given to XENA Gould on Jack Jackson  being transferred to [facility] for routine post-op       Report consisted of patient's Situation, Background, Assessment and   Recommendations(SBAR).     Information from the following report(s) Nurse Handoff Report and Cardiac Rhythm SR/ST  was reviewed with the receiving nurse.           Lines:   Peripheral IV 02/08/24 Posterior;Right Hand (Active)   Site Assessment Clean, dry & intact 02/08/24 0958   Line Status Infusing 02/08/24 0958   Phlebitis Assessment No symptoms 02/08/24 0958   Infiltration Assessment 0 02/08/24 0958   Alcohol Cap Used No 02/08/24 0958   Dressing Status Clean, dry & intact 02/08/24 0958   Dressing Type Transparent 02/08/24 0958        Opportunity for questions and clarification was provided.      Patient transported with:  O2 @ room air lpm

## 2024-02-08 NOTE — ANESTHESIA POSTPROCEDURE EVALUATION
Department of Anesthesiology  Postprocedure Note    Patient: Jack Jackson  MRN: [id number]  YOB: 2000  Date of evaluation: 2/8/2024    Procedure Summary       Date: 02/08/24 Room / Location: [facility] MAIN OR 02 / [facility] MAIN OR    Anesthesia Start: 0653 Anesthesia Stop: 1001    Procedure: ATTEMPTED LAPAROSCOPY DIAGNOSTIC/ RIGHT SALPINGECTOMY AND RESECTION OF PELVIC MASS (Right: Abdomen) Diagnosis:       Fibroid      Pelvic pain in female      (Fibroid [D21.9])      (Pelvic pain in female [R10.2])    Surgeons: Markus Orlando MD Responsible Provider: Ward Martinez IV, MD    Anesthesia Type: general ASA Status: 2            Anesthesia Type: No value filed.    Darleen Phase I: Darleen Score: 10    Darleen Phase II:      Anesthesia Post Evaluation    Patient location during evaluation: PACU  Patient participation: complete - patient participated  Level of consciousness: sleepy but conscious  Airway patency: patent  Nausea & Vomiting: no nausea and no vomiting  Cardiovascular status: hemodynamically stable  Respiratory status: acceptable, nonlabored ventilation and spontaneous ventilation  Hydration status: euvolemic  Comments: /77   Pulse (!) 111   Temp 99.3 °F (37.4 °C) (Oral)   Resp 16   Ht 1.651 m (5' 5\")   Wt 65.9 kg (145 lb 3.2 oz)   LMP 02/06/2024   SpO2 99%   BMI 24.16 kg/m²     Multimodal analgesia pain management approach  Pain management: adequate and satisfactory to patient        No notable events documented.

## 2024-02-08 NOTE — ANESTHESIA PROCEDURE NOTES
Peripheral Block    Patient location during procedure: OR  Reason for block: post-op pain management and at surgeon's request  Start time: 2/8/2024 9:41 AM  End time: 2/8/2024 9:46 AM  Staffing  Performed: anesthesiologist   Anesthesiologist: Ward Martinez IV, MD  Performed by: Ward Martinez IV, MD  Authorized by: Ward Martinez IV, MD    Preanesthetic Checklist  Completed: patient identified, pre-op evaluation, timeout performed and monitors applied/VS acknowledged  Peripheral Block   Patient position: supine  Prep: ChloraPrep  Provider prep: mask  Block type: TAP  Laterality: bilateral  Injection technique: single-shot  Guidance: ultrasound guided    Needle   Needle type: insulated echogenic nerve stimulator needle   Needle gauge: 20 G  Needle localization: ultrasound guidance  Needle length: 10 cm  Assessment   Injection assessment: negative aspiration for heme, local visualized surrounding nerve on ultrasound and no intravascular symptoms  Slow fractionated injection: yes  Hemodynamics: stable  Real-time US image taken/store: yes  Outcomes: uncomplicated and patient tolerated procedure well    Additional Notes  Each side with 15ml NS and 5mg decadron diluted to 30ml with NS.  Medications Administered  dexAMETHasone (DECADRON) (PF) 10 mg/mL injection - Other   10 mg - 2/8/2024 9:41:00 AM  ropivacaine (NAROPIN) injection 0.5% - Perineural   30 mL - 2/8/2024 9:41:00 AM

## 2024-02-08 NOTE — DISCHARGE INSTRUCTIONS
INSTRUCTIONS FOLLOWING GYN LAPAROSCOPY    ACTIVITY   Limit activity today; increase activity tomorrow, but no vigorous exercise   Shower only; no tub baths   No douches, tampons or intercourse until your doctor releases you (at least 2 weeks)   May return to work or school as directed by your doctor    DIET   Clear liquids until no nausea or vomiting   Advance to regular diet as tolerated    PAIN   Expect a moderate amount of pain.   Take pain medication as directed by your doctor. If no prescription for pain is sent home with you, take the appropriate dose of your commonly used pain medication.   Call you doctor if pain is NOT relieved by medication.   DO NOT take aspirin or blood thinners until directed by your doctor.    You will probably have bloody discharge (like a period) for 1-2 days, then watery discharge for another 7 days.    You will want to use a perineal pad, not tampons for this.      DRESSING CARE   Change dressing / band aids as directed by your doctor. You will experience bleeding similar to a period for the next couple of days followed by watery discharge up to seven more days.    FOLLOW PHONE CALLS  * Calls will be made by nursing staff.   If you have any problems or concerns, call your doctor as needed.    CALL YOUR DOCTOR IF   Excessive bleeding that does not stop after holding mild pressure over the area for 15 minutes   You soak a pad in an hour   Temperature of 101°F or above   Green or yellow, smelly drainage or discharge   You are unable to urinate by bedtime   Nausea and vomiting that does not stop by bedtime    MEDICATION INTERACTION:    During your procedure you potentially received a medication or medications which may reduce the effectiveness of oral contraceptives. Please consider other forms of contraception for 1 month following your procedure if you are currently using oral contraceptives as your primary form of birth control. In addition to this, we recommend continuing your oral

## 2024-02-08 NOTE — OP NOTE
Operative Note      Patient: Jack Jackson  YOB: 2000  MRN: [id number]    Date of Procedure: 2/8/2024    Pre-Op Diagnosis Codes:     * Fibroid [D21.9]     * Pelvic pain in female [R10.2]  Right ovarian cystic mass    Post-Op Diagnosis: Pelvic mass       Procedure(s):  ATTEMPTED LAPAROSCOPY DIAGNOSTIC/ RIGHT SALPINGECTOMY AND RESECTION OF PELVIC MASS    Surgeon(s):  Markus Orlando MD Berlet-Dach, Stephanie, MD    Assistant:   Dr William Thomas's assistance was required due to the complex nature of the case.  Assistant positioned the uterus and pelvic mass.  Applied counter traction for safe resection of pelvic mass      Anesthesia: General    Estimated Blood Loss (mL): 600    Complications: None    Specimens:   ID Type Source Tests Collected by Time Destination   1 : UTERINE CAVITY Swab Uterus CULTURE, ANAEROBIC, CULTURE, WOUND Markus Orlando MD 2/8/2024 0835    2 : CONTENTS OF UTERINE CAVITY Swab Uterus CULTURE, ANAEROBIC, CULTURE, WOUND Markus Orlando MD 2/8/2024 0837    A : RIGHT FALLOPIAN TUBE AND PELVIC MASS Tissue Fallopian Tube SURGICAL PATHOLOGY Markus Orlando MD 2/8/2024 0851        Implants:  * No implants in log *      Drains:   [REMOVED] Urinary Catheter 02/08/24 2 Way;Quinteros (Removed)   Catheter Indications Perioperative use for selected surgical procedures 02/08/24 1416   Site Assessment No urethral drainage 02/08/24 1041   Urine Color Yellow 02/08/24 1416   Urine Appearance Clear 02/08/24 1416   Collection Container Standard 02/08/24 1416   Securement Method Securing device (Describe) 02/08/24 1416   Catheter Best Practices  Drainage tube clipped to bed;Catheter secured to thigh;Tamper seal intact;Bag below bladder;Bag not on floor;Lack of dependent loop in tubing;Drainage bag less than half full 02/08/24 1416   Status Draining;Patent 02/08/24 1416   Output (mL) 700 mL 02/08/24 1416       Findings: Enlarged approximately 15 cm right pelvic mass involving the tube and ovary that

## 2024-02-08 NOTE — PLAN OF CARE
Problem: Pain  Goal: Verbalizes/displays adequate comfort level or baseline comfort level  2/8/2024 1515 by Bryanna Carroll, RN  Outcome: Progressing  2/8/2024 1514 by Bryanna Carroll, RN  Outcome: Progressing     Problem: Discharge Planning  Goal: Discharge to home or other facility with appropriate resources  2/8/2024 1515 by Bryanna Carroll, RN  Outcome: Progressing  2/8/2024 1514 by Bryanna Carroll, RN  Outcome: Progressing     Problem: ABCDS Injury Assessment  Goal: Absence of physical injury  2/8/2024 1515 by Bryanna Carroll, RN  Outcome: Progressing  2/8/2024 1514 by Bryanna Carroll, RN  Outcome: Progressing

## 2024-02-08 NOTE — BRIEF OP NOTE
Brief Postoperative Note      Patient: Jack Jackson  YOB: 2000  MRN: [id number]    Date of Procedure: 2/8/2024    Pre-Op Diagnosis Codes:     * Fibroid [D21.9]     * Pelvic pain in female [R10.2]    Post-Op Diagnosis: Same and pelvic mass       Procedure(s):  ATTEMPTED LAPAROSCOPY DIAGNOSTIC/ RIGHT SALPINGECTOMY AND RESECTION OF PELVIC MASS Laparotomy    Surgeon(s):  Markus Orlando MD Berlet-Dach, Stephanie, MD    Assistant:  Dr William Thomas's assistance was required due to the complex nature of the case.  Assistant positioned the uterus and pelvic mass.  Applied counter traction for safe resection of pelvic mass     Anesthesia: General    Estimated Blood Loss (mL): 600    Complications: None    Specimens:   ID Type Source Tests Collected by Time Destination   1 : UTERINE CAVITY Swab Uterus CULTURE, ANAEROBIC, CULTURE, WOUND Markus Orlando MD 2/8/2024 0835    2 : CONTENTS OF UTERINE CAVITY Swab Uterus CULTURE, ANAEROBIC, CULTURE, WOUND Markus Orlando MD 2/8/2024 0837    A : RIGHT FALLOPIAN TUBE AND PELVIC MASS Tissue Fallopian Tube SURGICAL PATHOLOGY Markus Orlando MD 2/8/2024 0851        Implants:  * No implants in log *      Drains:   Urinary Catheter 02/08/24 2 Way;Quinteros (Active)       Findings: Right pelvic mass involving posterior fundal uterus and Right tube and ovary      Electronically signed by Markus Orlando MD on 2/8/2024 at 9:58 AM

## 2024-02-08 NOTE — INTERVAL H&P NOTE
Update History & Physical    The patient's History and Physical of February 1, 2024 was reviewed with the patient and I examined the patient. There was no change. The surgical site was confirmed by the patient and me.     Plan: The risks, benefits, expected outcome, and alternative to the recommended procedure have been discussed with the patient. Patient understands and wants to proceed with the procedure.     Electronically signed by Markus Orlando MD on 2/8/2024 at 7:04 AM

## 2024-02-09 LAB
CREAT SERPL-MCNC: 0.78 MG/DL (ref 0.6–1)
ERYTHROCYTE [DISTWIDTH] IN BLOOD BY AUTOMATED COUNT: 13.5 % (ref 11.9–14.6)
ERYTHROCYTE [DISTWIDTH] IN BLOOD BY AUTOMATED COUNT: 13.6 % (ref 11.9–14.6)
HCT VFR BLD AUTO: 29.4 % (ref 35.8–46.3)
HCT VFR BLD AUTO: 30.6 % (ref 35.8–46.3)
HGB BLD-MCNC: 10.2 G/DL (ref 11.7–15.4)
HGB BLD-MCNC: 9.7 G/DL (ref 11.7–15.4)
LACTATE SERPL-SCNC: 1 MMOL/L (ref 0.4–2)
MCH RBC QN AUTO: 27.9 PG (ref 26.1–32.9)
MCH RBC QN AUTO: 28 PG (ref 26.1–32.9)
MCHC RBC AUTO-ENTMCNC: 33 G/DL (ref 31.4–35)
MCHC RBC AUTO-ENTMCNC: 33.3 G/DL (ref 31.4–35)
MCV RBC AUTO: 83.8 FL (ref 82–102)
MCV RBC AUTO: 84.7 FL (ref 82–102)
NRBC # BLD: 0 K/UL (ref 0–0.2)
NRBC # BLD: 0 K/UL (ref 0–0.2)
PLATELET # BLD AUTO: 166 K/UL (ref 150–450)
PLATELET # BLD AUTO: 178 K/UL (ref 150–450)
PMV BLD AUTO: 10 FL (ref 9.4–12.3)
PMV BLD AUTO: 10.1 FL (ref 9.4–12.3)
RBC # BLD AUTO: 3.47 M/UL (ref 4.05–5.2)
RBC # BLD AUTO: 3.65 M/UL (ref 4.05–5.2)
WBC # BLD AUTO: 21.3 K/UL (ref 4.3–11.1)
WBC # BLD AUTO: 23.6 K/UL (ref 4.3–11.1)

## 2024-02-09 PROCEDURE — 83605 ASSAY OF LACTIC ACID: CPT

## 2024-02-09 PROCEDURE — 6360000002 HC RX W HCPCS: Performed by: OBSTETRICS & GYNECOLOGY

## 2024-02-09 PROCEDURE — 1100000000 HC RM PRIVATE

## 2024-02-09 PROCEDURE — 2580000003 HC RX 258: Performed by: OBSTETRICS & GYNECOLOGY

## 2024-02-09 PROCEDURE — 82565 ASSAY OF CREATININE: CPT

## 2024-02-09 PROCEDURE — 6370000000 HC RX 637 (ALT 250 FOR IP): Performed by: OBSTETRICS & GYNECOLOGY

## 2024-02-09 PROCEDURE — 36415 COLL VENOUS BLD VENIPUNCTURE: CPT

## 2024-02-09 PROCEDURE — 85027 COMPLETE CBC AUTOMATED: CPT

## 2024-02-09 PROCEDURE — 87040 BLOOD CULTURE FOR BACTERIA: CPT

## 2024-02-09 RX ORDER — CLINDAMYCIN PHOSPHATE 900 MG/50ML
900 INJECTION, SOLUTION INTRAVENOUS EVERY 8 HOURS
Status: DISCONTINUED | OUTPATIENT
Start: 2024-02-09 | End: 2024-02-09 | Stop reason: RX

## 2024-02-09 RX ORDER — CLINDAMYCIN PHOSPHATE 900 MG/50ML
900 INJECTION, SOLUTION INTRAVENOUS EVERY 8 HOURS
Status: DISCONTINUED | OUTPATIENT
Start: 2024-02-09 | End: 2024-02-09 | Stop reason: SDUPTHER

## 2024-02-09 RX ORDER — METRONIDAZOLE 500 MG/100ML
500 INJECTION, SOLUTION INTRAVENOUS EVERY 8 HOURS
Status: DISCONTINUED | OUTPATIENT
Start: 2024-02-09 | End: 2024-02-09

## 2024-02-09 RX ADMIN — CLINDAMYCIN PHOSPHATE 900 MG: 900 INJECTION, SOLUTION INTRAVENOUS at 23:09

## 2024-02-09 RX ADMIN — GENTAMICIN SULFATE 320 MG: 40 INJECTION, SOLUTION INTRAMUSCULAR; INTRAVENOUS at 21:46

## 2024-02-09 RX ADMIN — OXYCODONE 5 MG: 5 TABLET ORAL at 15:12

## 2024-02-09 RX ADMIN — SODIUM CHLORIDE, PRESERVATIVE FREE 10 ML: 5 INJECTION INTRAVENOUS at 20:08

## 2024-02-09 RX ADMIN — OXYCODONE 10 MG: 5 TABLET ORAL at 02:45

## 2024-02-09 RX ADMIN — AMPICILLIN SODIUM 2000 MG: 2 INJECTION, POWDER, FOR SOLUTION INTRAMUSCULAR; INTRAVENOUS at 20:10

## 2024-02-09 RX ADMIN — SODIUM CHLORIDE, PRESERVATIVE FREE 10 ML: 5 INJECTION INTRAVENOUS at 09:13

## 2024-02-09 RX ADMIN — IBUPROFEN 800 MG: 800 TABLET, FILM COATED ORAL at 22:45

## 2024-02-09 RX ADMIN — SODIUM CHLORIDE, SODIUM LACTATE, POTASSIUM CHLORIDE, CALCIUM CHLORIDE AND DEXTROSE MONOHYDRATE: 5; 600; 310; 30; 20 INJECTION, SOLUTION INTRAVENOUS at 02:50

## 2024-02-09 RX ADMIN — KETOROLAC TROMETHAMINE 30 MG: 30 INJECTION, SOLUTION INTRAMUSCULAR; INTRAVENOUS at 05:55

## 2024-02-09 RX ADMIN — OXYCODONE 10 MG: 5 TABLET ORAL at 21:19

## 2024-02-09 RX ADMIN — IBUPROFEN 800 MG: 800 TABLET, FILM COATED ORAL at 16:46

## 2024-02-09 RX ADMIN — ACETAMINOPHEN 1000 MG: 500 TABLET, FILM COATED ORAL at 19:25

## 2024-02-09 RX ADMIN — SODIUM CHLORIDE 50 ML: 9 INJECTION, SOLUTION INTRAVENOUS at 20:09

## 2024-02-09 RX ADMIN — KETOROLAC TROMETHAMINE 30 MG: 30 INJECTION, SOLUTION INTRAMUSCULAR; INTRAVENOUS at 09:12

## 2024-02-09 RX ADMIN — OXYCODONE 5 MG: 5 TABLET ORAL at 11:19

## 2024-02-09 NOTE — PROGRESS NOTES
Gynecology Progress Note    Patient doing well post-op day 1 without significant complaints.  Pain controlled on current medication.  Voiding without difficulty.     Vitals:  Blood pressure (!) 100/45, pulse (!) 118, temperature 98.8 °F (37.1 °C), temperature source Oral, resp. rate 16, height 1.651 m (5' 5\"), weight 65.9 kg (145 lb 3.2 oz), last menstrual period 2024, SpO2 97 %, not currently breastfeeding.  Temp (24hrs), Av.8 °F (37.1 °C), Min:98 °F (36.7 °C), Max:99.6 °F (37.6 °C)        Exam:  Patient without distress.               Abdomen soft,    appropriately tender               Incision dry and clean without erythema.               Lower extremities are negative for swelling, cords, or tenderness.    Lab/Data Review:  Lab Results   Component Value Date    WBC 23.6 (H) 2024    HGB 10.2 (L) 2024    HCT 30.6 (L) 2024    MCV 83.8 2024     2024         Assessment and Plan:  Patient appears to be having uncomplicated post OP course.  Continue routine post-op care.   Feel white count elevation due to inflammatory changes from her surgery.  No sign of infection.  Will increase activity today stop her IV fluids recheck CBC in the morning observe also for any other signs of infection.  Questions answered from patient and her sister.

## 2024-02-09 NOTE — CARE COORDINATION
CM met with pt at bedside for initial CM assessment. Pt admitted with pelvic mass. Pt alert and oriented x 4. Demographics, insurance, and PCP discussed and verified. Pt reported no PCP at this time with [place] after discharge. No PCP referral requested at this time. No needs anticipated at discharge. Pt independent with ADLs PTA.     CM to continue to follow.       02/09/24 1521   Service Assessment   Patient Orientation Alert and Oriented;Person;Place;Situation;Self   Cognition Alert   History Provided By Patient   Primary Caregiver Self   Accompanied By/Relationship n/a   Support Systems Family Members   Patient's Healthcare Decision Maker is: Legal Next of Kin   PCP Verified by CM No   Prior Functional Level Independent in ADLs/IADLs   Current Functional Level Independent in ADLs/IADLs   Can patient return to prior living arrangement Yes   Ability to make needs known: Good   Family able to assist with home care needs: Yes   Would you like for me to discuss the discharge plan with any other family members/significant others, and if so, who? No   Financial Resources Other (Comment)  ([facility])   Community Resources None   CM/SW Referral Other (see comment)  (n/a)   Social/Functional History   ADL Assistance Independent   Homemaking Assistance Independent   Homemaking Responsibilities Yes   Ambulation Assistance Independent   Transfer Assistance Independent   Occupation Unemployed   Discharge Planning   Type of Residence House   Current Services Prior To Admission None   Potential Assistance Needed N/A   DME Ordered? No   Potential Assistance Purchasing Medications No   Type of Home Care Services None   Patient expects to be discharged to: House   Condition of Participation: Discharge Planning   The Plan for Transition of Care is related to the following treatment goals: Pt to return home when medically stable.

## 2024-02-10 LAB
BACTERIA SPEC CULT: ABNORMAL
BACTERIA SPEC CULT: ABNORMAL
BASOPHILS # BLD: 0 K/UL (ref 0–0.2)
BASOPHILS NFR BLD: 0 % (ref 0–2)
DIFFERENTIAL METHOD BLD: ABNORMAL
EOSINOPHIL # BLD: 0 K/UL (ref 0–0.8)
EOSINOPHIL NFR BLD: 0 % (ref 0.5–7.8)
ERYTHROCYTE [DISTWIDTH] IN BLOOD BY AUTOMATED COUNT: 13.7 % (ref 11.9–14.6)
GENTAMICIN SERPL-MCNC: <0.2 UG/ML
GRAM STN SPEC: ABNORMAL
GRAM STN SPEC: ABNORMAL
HCT VFR BLD AUTO: 28 % (ref 35.8–46.3)
HGB BLD-MCNC: 9.3 G/DL (ref 11.7–15.4)
IMM GRANULOCYTES # BLD AUTO: 0.2 K/UL (ref 0–0.5)
IMM GRANULOCYTES NFR BLD AUTO: 1 % (ref 0–5)
LYMPHOCYTES # BLD: 1.5 K/UL (ref 0.5–4.6)
LYMPHOCYTES NFR BLD: 10 % (ref 13–44)
MCH RBC QN AUTO: 28.4 PG (ref 26.1–32.9)
MCHC RBC AUTO-ENTMCNC: 33.2 G/DL (ref 31.4–35)
MCV RBC AUTO: 85.4 FL (ref 82–102)
MONOCYTES # BLD: 0.9 K/UL (ref 0.1–1.3)
MONOCYTES NFR BLD: 6 % (ref 4–12)
NEUTS SEG # BLD: 12.8 K/UL (ref 1.7–8.2)
NEUTS SEG NFR BLD: 83 % (ref 43–78)
NRBC # BLD: 0 K/UL (ref 0–0.2)
PLATELET # BLD AUTO: 169 K/UL (ref 150–450)
PLATELET COMMENT: ADEQUATE
PMV BLD AUTO: 10.2 FL (ref 9.4–12.3)
RBC # BLD AUTO: 3.28 M/UL (ref 4.05–5.2)
RBC MORPH BLD: ABNORMAL
SERVICE CMNT-IMP: ABNORMAL
WBC # BLD AUTO: 15.4 K/UL (ref 4.3–11.1)
WBC MORPH BLD: ABNORMAL

## 2024-02-10 PROCEDURE — 2580000003 HC RX 258: Performed by: OBSTETRICS & GYNECOLOGY

## 2024-02-10 PROCEDURE — 80170 ASSAY OF GENTAMICIN: CPT

## 2024-02-10 PROCEDURE — 6370000000 HC RX 637 (ALT 250 FOR IP): Performed by: OBSTETRICS & GYNECOLOGY

## 2024-02-10 PROCEDURE — 1100000000 HC RM PRIVATE

## 2024-02-10 PROCEDURE — 85025 COMPLETE CBC W/AUTO DIFF WBC: CPT

## 2024-02-10 PROCEDURE — 6360000002 HC RX W HCPCS: Performed by: OBSTETRICS & GYNECOLOGY

## 2024-02-10 PROCEDURE — 36415 COLL VENOUS BLD VENIPUNCTURE: CPT

## 2024-02-10 RX ORDER — CLINDAMYCIN PHOSPHATE 900 MG/50ML
900 INJECTION, SOLUTION INTRAVENOUS EVERY 8 HOURS
Status: DISCONTINUED | OUTPATIENT
Start: 2024-02-10 | End: 2024-02-10

## 2024-02-10 RX ORDER — CLINDAMYCIN HYDROCHLORIDE 150 MG/1
450 CAPSULE ORAL EVERY 6 HOURS
Status: DISCONTINUED | OUTPATIENT
Start: 2024-02-10 | End: 2024-02-12 | Stop reason: HOSPADM

## 2024-02-10 RX ORDER — DOCUSATE SODIUM 100 MG/1
100 CAPSULE, LIQUID FILLED ORAL DAILY
Status: DISCONTINUED | OUTPATIENT
Start: 2024-02-10 | End: 2024-02-12 | Stop reason: HOSPADM

## 2024-02-10 RX ADMIN — OXYCODONE 5 MG: 5 TABLET ORAL at 19:32

## 2024-02-10 RX ADMIN — IBUPROFEN 800 MG: 800 TABLET, FILM COATED ORAL at 14:36

## 2024-02-10 RX ADMIN — AMPICILLIN SODIUM 2000 MG: 2 INJECTION, POWDER, FOR SOLUTION INTRAMUSCULAR; INTRAVENOUS at 02:46

## 2024-02-10 RX ADMIN — ONDANSETRON 4 MG: 2 INJECTION INTRAMUSCULAR; INTRAVENOUS at 07:41

## 2024-02-10 RX ADMIN — DOCUSATE SODIUM 100 MG: 100 CAPSULE, LIQUID FILLED ORAL at 14:36

## 2024-02-10 RX ADMIN — IBUPROFEN 800 MG: 800 TABLET, FILM COATED ORAL at 07:15

## 2024-02-10 RX ADMIN — WATER 1000 MG: 1 INJECTION INTRAMUSCULAR; INTRAVENOUS; SUBCUTANEOUS at 21:19

## 2024-02-10 RX ADMIN — CLINDAMYCIN PHOSPHATE 900 MG: 900 INJECTION, SOLUTION INTRAVENOUS at 07:48

## 2024-02-10 RX ADMIN — OXYCODONE 5 MG: 5 TABLET ORAL at 12:33

## 2024-02-10 RX ADMIN — CLINDAMYCIN HYDROCHLORIDE 450 MG: 150 CAPSULE ORAL at 15:48

## 2024-02-10 RX ADMIN — AMPICILLIN SODIUM 2000 MG: 2 INJECTION, POWDER, FOR SOLUTION INTRAMUSCULAR; INTRAVENOUS at 07:44

## 2024-02-10 RX ADMIN — CLINDAMYCIN HYDROCHLORIDE 450 MG: 150 CAPSULE ORAL at 22:30

## 2024-02-10 RX ADMIN — IBUPROFEN 800 MG: 800 TABLET, FILM COATED ORAL at 22:33

## 2024-02-10 RX ADMIN — SODIUM CHLORIDE, PRESERVATIVE FREE 10 ML: 5 INJECTION INTRAVENOUS at 21:33

## 2024-02-10 NOTE — PROGRESS NOTES
GENTAMICIN DAILY FOLLOW UP NOTE  [facility]   Pharmacy Pharmacokinetic Monitoring Service - GENTAMICIN    Consulting Provider: Darion   Indication: GYN Infection  Target levels: <0.2, Urban-Kashif Nomogram  Day of Therapy: 2  Additional Antimicrobials: Clinda + Ampicillin      Pertinent Laboratory Values:   Wt Readings from Last 1 Encounters:   02/08/24 65.9 kg (145 lb 3.2 oz)     Temp Readings from Last 1 Encounters:   02/10/24 98.4 °F (36.9 °C)     Recent Labs     02/09/24  0625 02/09/24  2007 02/10/24  0757   CREATININE  --  0.78  --    WBC 23.6* 21.3* 15.4*   LACACIDPL  --  1.0  --      Estimated Creatinine Clearance: 103 mL/min (based on SCr of 0.78 mg/dL).    Lab Results   Component Value Date/Time    GENTRANDOM <0.2 02/10/2024 07:57 AM         Assessment:  Date/Time Dose Concentration   2/10 0757 320mg IV q24h <0.2       Plan:  Dosing recommendations based on Urban-Kashif Nomogram  Continue gentamicin 320mg IV q24h  Renal labs as indicated   Gentamicin concentrations will be ordered as clinically appropriate   Pharmacy will continue to monitor patient and adjust therapy as indicated  Repeat random level after 48-72 hours    Thank you for the consult,    Jeffy Verma, PharmD, BCPS  2/10/2024

## 2024-02-10 NOTE — PROGRESS NOTES
Gynecology Progress Note    Patient doing well post-op day 2 without significant complaints.  Pain controlled on current medication.  Voiding without difficulty.   She is tolerating p.o. diet.  Is having no nausea.  Last evening had a spike in temperature to 102.9.  She is also tachycardic.  CBC showed white count of 21 which was actually decreased from the 23 earlier yesterday.  Blood cultures were obtained and she was started on triple antibiotics.  Since then she states she feels better.    Vitals:  Blood pressure 108/68, pulse 98, temperature 98.4 °F (36.9 °C), resp. rate 16, height 1.651 m (5' 5\"), weight 65.9 kg (145 lb 3.2 oz), last menstrual period 2024, SpO2 96 %, not currently breastfeeding.  Temp (24hrs), Av.4 °F (37.4 °C), Min:98.3 °F (36.8 °C), Max:102.9 °F (39.4 °C)        Exam:  Patient without distress.               Abdomen soft, appropriately tender no rebound nondistended.                 Incision dry and clean without erythema.               Lower extremities are negative for swelling, cords, or tenderness.    Lab/Data Review:  Lactate last evening was 1.0.  CBC from this morning is pending.  Culture from surgery is positive for gram-negative cocci bacilli identification is pending.  Blood cultures from last evening are so far negative    Assessment and Plan: With fever last evening and cultures coming back positive discussed with patient and her sister that this would be very consistent with the mass having been a TOA.  Discussed did not appear acute infection at the time of the surgery.  In discussing things she had no acute episode of symptoms consistent with prior PID that would have evolved into this but it does appear this was more of a chronic in nature.  She seems to be responding to the antibiotics appropriately.  Did discuss that a TOA of that size it was necessary to remove which we have done.  At this point she should respond to the IV antibiotics.  Will plan on keeping her

## 2024-02-10 NOTE — PROGRESS NOTES
Pharmacy Gentamicin Progress Note    [facility]   Pharmacy Pharmacokinetic Monitoring Service - Gentamicin     Jack Jackson is a 23 y.o. female starting on gentamicin therapy for gyn infection.   Pharmacy consulted for monitoring and adjustment by Dr Orlando.     Target Concentration:  Using Urban-Kashif Nomogram      Pertinent Laboratory Values:   Ht Readings from Last 1 Encounters:   02/08/24 1.651 m (5' 5\")      Wt Readings from Last 1 Encounters:   02/08/24 65.9 kg (145 lb 3.2 oz)     Temp Readings from Last 1 Encounters:   02/09/24 98.8 °F (37.1 °C) (Oral)     Recent Labs     02/09/24  0625   WBC 23.6*     CrCl cannot be calculated (No successful lab value found.).  sCr ordered, waiting for results      Plan:  Start Gentamicin at 320 mg q24h  Estimated levels on therapy:  Random levels per Urban-Kashif Nomogram   Renal labs as indicated.  Ordered stat sCr  Gentamicin concentration ordered for 2/10 @ 0800    Pharmacy will continue to monitor patient and adjust therapy as indicated      Thank you for the consult,    Noemi Spence, JacquelineD

## 2024-02-10 NOTE — PROGRESS NOTES
MD notified of HR/temp. New orders received at this time.    This RN called lab to notify of new stat orders.

## 2024-02-10 NOTE — PLAN OF CARE
Problem: Pain  Goal: Verbalizes/displays adequate comfort level or baseline comfort level  Outcome: Progressing     Problem: Discharge Planning  Goal: Discharge to home or other facility with appropriate resources  Outcome: Progressing  Flowsheets (Taken 2/9/2024 1928)  Discharge to home or other facility with appropriate resources: Identify discharge learning needs (meds, wound care, etc)     Problem: ABCDS Injury Assessment  Goal: Absence of physical injury  Outcome: Progressing

## 2024-02-11 LAB
ALBUMIN SERPL-MCNC: 2.4 G/DL (ref 3.5–5)
ALBUMIN/GLOB SERPL: 0.5 (ref 0.4–1.6)
ALP SERPL-CCNC: 130 U/L (ref 50–136)
ALT SERPL-CCNC: 32 U/L (ref 12–65)
ANION GAP SERPL CALC-SCNC: 4 MMOL/L (ref 2–11)
AST SERPL-CCNC: 23 U/L (ref 15–37)
BASOPHILS # BLD: 0 K/UL (ref 0–0.2)
BASOPHILS NFR BLD: 0 % (ref 0–2)
BILIRUB SERPL-MCNC: 0.3 MG/DL (ref 0.2–1.1)
BUN SERPL-MCNC: 6 MG/DL (ref 6–23)
CALCIUM SERPL-MCNC: 8.8 MG/DL (ref 8.3–10.4)
CHLORIDE SERPL-SCNC: 106 MMOL/L (ref 103–113)
CO2 SERPL-SCNC: 27 MMOL/L (ref 21–32)
CREAT SERPL-MCNC: 0.6 MG/DL (ref 0.6–1)
DIFFERENTIAL METHOD BLD: ABNORMAL
EOSINOPHIL # BLD: 0.1 K/UL (ref 0–0.8)
EOSINOPHIL NFR BLD: 1 % (ref 0.5–7.8)
ERYTHROCYTE [DISTWIDTH] IN BLOOD BY AUTOMATED COUNT: 13.5 % (ref 11.9–14.6)
GLOBULIN SER CALC-MCNC: 4.7 G/DL (ref 2.8–4.5)
GLUCOSE SERPL-MCNC: 101 MG/DL (ref 65–100)
HCT VFR BLD AUTO: 28.7 % (ref 35.8–46.3)
HGB BLD-MCNC: 9.5 G/DL (ref 11.7–15.4)
IMM GRANULOCYTES # BLD AUTO: 0.1 K/UL (ref 0–0.5)
IMM GRANULOCYTES NFR BLD AUTO: 1 % (ref 0–5)
LYMPHOCYTES # BLD: 1.6 K/UL (ref 0.5–4.6)
LYMPHOCYTES NFR BLD: 15 % (ref 13–44)
MCH RBC QN AUTO: 27.7 PG (ref 26.1–32.9)
MCHC RBC AUTO-ENTMCNC: 33.1 G/DL (ref 31.4–35)
MCV RBC AUTO: 83.7 FL (ref 82–102)
MONOCYTES # BLD: 0.7 K/UL (ref 0.1–1.3)
MONOCYTES NFR BLD: 6 % (ref 4–12)
NEUTS SEG # BLD: 8.2 K/UL (ref 1.7–8.2)
NEUTS SEG NFR BLD: 77 % (ref 43–78)
NRBC # BLD: 0 K/UL (ref 0–0.2)
PLATELET # BLD AUTO: 175 K/UL (ref 150–450)
PMV BLD AUTO: 10.1 FL (ref 9.4–12.3)
POTASSIUM SERPL-SCNC: 3.6 MMOL/L (ref 3.5–5.1)
PROT SERPL-MCNC: 7.1 G/DL (ref 6.3–8.2)
RBC # BLD AUTO: 3.43 M/UL (ref 4.05–5.2)
SODIUM SERPL-SCNC: 137 MMOL/L (ref 136–146)
WBC # BLD AUTO: 10.6 K/UL (ref 4.3–11.1)

## 2024-02-11 PROCEDURE — 2580000003 HC RX 258: Performed by: OBSTETRICS & GYNECOLOGY

## 2024-02-11 PROCEDURE — 6370000000 HC RX 637 (ALT 250 FOR IP): Performed by: OBSTETRICS & GYNECOLOGY

## 2024-02-11 PROCEDURE — 85025 COMPLETE CBC W/AUTO DIFF WBC: CPT

## 2024-02-11 PROCEDURE — 1100000000 HC RM PRIVATE

## 2024-02-11 PROCEDURE — 36415 COLL VENOUS BLD VENIPUNCTURE: CPT

## 2024-02-11 PROCEDURE — 6360000002 HC RX W HCPCS: Performed by: OBSTETRICS & GYNECOLOGY

## 2024-02-11 PROCEDURE — 80053 COMPREHEN METABOLIC PANEL: CPT

## 2024-02-11 RX ORDER — BISACODYL 10 MG
10 SUPPOSITORY, RECTAL RECTAL DAILY PRN
Status: DISCONTINUED | OUTPATIENT
Start: 2024-02-11 | End: 2024-02-12 | Stop reason: HOSPADM

## 2024-02-11 RX ORDER — POLYETHYLENE GLYCOL 3350 17 G/17G
17 POWDER, FOR SOLUTION ORAL DAILY
Status: DISCONTINUED | OUTPATIENT
Start: 2024-02-11 | End: 2024-02-12 | Stop reason: HOSPADM

## 2024-02-11 RX ADMIN — OXYCODONE 5 MG: 5 TABLET ORAL at 08:52

## 2024-02-11 RX ADMIN — CLINDAMYCIN HYDROCHLORIDE 450 MG: 150 CAPSULE ORAL at 08:50

## 2024-02-11 RX ADMIN — WATER 1000 MG: 1 INJECTION INTRAMUSCULAR; INTRAVENOUS; SUBCUTANEOUS at 21:02

## 2024-02-11 RX ADMIN — CLINDAMYCIN HYDROCHLORIDE 450 MG: 150 CAPSULE ORAL at 21:01

## 2024-02-11 RX ADMIN — SODIUM CHLORIDE, PRESERVATIVE FREE 10 ML: 5 INJECTION INTRAVENOUS at 21:11

## 2024-02-11 RX ADMIN — IBUPROFEN 800 MG: 800 TABLET, FILM COATED ORAL at 14:33

## 2024-02-11 RX ADMIN — IBUPROFEN 800 MG: 800 TABLET, FILM COATED ORAL at 06:29

## 2024-02-11 RX ADMIN — POLYETHYLENE GLYCOL 3350 17 G: 17 POWDER, FOR SOLUTION ORAL at 09:26

## 2024-02-11 RX ADMIN — OXYCODONE 5 MG: 5 TABLET ORAL at 17:12

## 2024-02-11 RX ADMIN — CLINDAMYCIN HYDROCHLORIDE 450 MG: 150 CAPSULE ORAL at 14:33

## 2024-02-11 RX ADMIN — CLINDAMYCIN HYDROCHLORIDE 450 MG: 150 CAPSULE ORAL at 04:21

## 2024-02-11 RX ADMIN — DOCUSATE SODIUM 100 MG: 100 CAPSULE, LIQUID FILLED ORAL at 08:50

## 2024-02-11 RX ADMIN — IBUPROFEN 800 MG: 800 TABLET, FILM COATED ORAL at 21:01

## 2024-02-11 NOTE — PROGRESS NOTES
Gynecology Progress Note    Patient doing well post-op day 3 from laparoscopy with laparotomy without significant complaints.  Pain controlled on current medication.  Voiding without difficulty. Patient is passing flatus.    Vitals:  Blood pressure 110/77, pulse 94, temperature 98.4 °F (36.9 °C), temperature source Oral, resp. rate 16, height 1.651 m (5' 5\"), weight 65.9 kg (145 lb 3.2 oz), last menstrual period 2024, SpO2 96 %, not currently breastfeeding.  Temp (24hrs), Av.6 °F (37 °C), Min:98.4 °F (36.9 °C), Max:98.8 °F (37.1 °C)        Exam:  Patient without distress.  Heart rrr   Lungs cta b&s                Abdomen soft,  nontender.                 Incision dry and clean without erythema.               Lower extremities are negative for swelling, cords, or tenderness.    Lab/Data Review:  Labs reviewed    Assessment and Plan:  pt with postop fever and temp 102.9 nearly 2 days ago.  Culture with h flu.  On rocephin and cleocin. Hopefully change to oral meds tomorrow and home. Spoke with her sister and updated her.  Pathology pending.

## 2024-02-12 VITALS
HEIGHT: 65 IN | OXYGEN SATURATION: 99 % | RESPIRATION RATE: 18 BRPM | BODY MASS INDEX: 24.19 KG/M2 | SYSTOLIC BLOOD PRESSURE: 107 MMHG | WEIGHT: 145.2 LBS | DIASTOLIC BLOOD PRESSURE: 72 MMHG | HEART RATE: 78 BPM | TEMPERATURE: 98.1 F

## 2024-02-12 LAB
AEROBE ID RESULT 1: ABNORMAL
BACTERIA ISLT: ABNORMAL
SPECIMEN SOURCE: ABNORMAL

## 2024-02-12 PROCEDURE — 6370000000 HC RX 637 (ALT 250 FOR IP): Performed by: OBSTETRICS & GYNECOLOGY

## 2024-02-12 PROCEDURE — 2580000003 HC RX 258: Performed by: OBSTETRICS & GYNECOLOGY

## 2024-02-12 RX ORDER — CEFDINIR 300 MG/1
300 CAPSULE ORAL 2 TIMES DAILY
Qty: 14 CAPSULE | Refills: 0 | Status: SHIPPED | OUTPATIENT
Start: 2024-02-12 | End: 2024-02-19

## 2024-02-12 RX ADMIN — SODIUM CHLORIDE, PRESERVATIVE FREE 10 ML: 5 INJECTION INTRAVENOUS at 08:12

## 2024-02-12 RX ADMIN — CLINDAMYCIN HYDROCHLORIDE 450 MG: 150 CAPSULE ORAL at 03:30

## 2024-02-12 RX ADMIN — CLINDAMYCIN HYDROCHLORIDE 450 MG: 150 CAPSULE ORAL at 08:11

## 2024-02-12 RX ADMIN — IBUPROFEN 800 MG: 800 TABLET, FILM COATED ORAL at 14:21

## 2024-02-12 RX ADMIN — IBUPROFEN 800 MG: 800 TABLET, FILM COATED ORAL at 05:48

## 2024-02-12 RX ADMIN — DOCUSATE SODIUM 100 MG: 100 CAPSULE, LIQUID FILLED ORAL at 08:11

## 2024-02-12 RX ADMIN — OXYCODONE 5 MG: 5 TABLET ORAL at 12:03

## 2024-02-12 RX ADMIN — CLINDAMYCIN HYDROCHLORIDE 450 MG: 150 CAPSULE ORAL at 14:21

## 2024-02-12 RX ADMIN — POLYETHYLENE GLYCOL 3350 17 G: 17 POWDER, FOR SOLUTION ORAL at 08:11

## 2024-02-12 RX ADMIN — OXYCODONE 5 MG: 5 TABLET ORAL at 03:34

## 2024-02-12 NOTE — CARE COORDINATION
Pt is for discharge home today with family and no needs/supportive care orders recieved for CM at this time.         02/12/24 0918   Service Assessment   Patient's Healthcare Decision Maker is: Legal Next of Kin   Social/Functional History   ADL Assistance Independent   Homemaking Assistance Independent   Homemaking Responsibilities Yes   Ambulation Assistance Independent   Transfer Assistance Independent   Occupation Unemployed   Discharge Planning   Patient expects to be discharged to:  --    Services At/After Discharge   Transition of Care Consult (CM Consult) N/A   Services At/After Discharge None   [place] Resource Information Provided? No   Mode of Transport at Discharge Other (see comment)  (family)   Confirm Follow Up Transport Family   Condition of Participation: Discharge Planning   The Plan for Transition of Care is related to the following treatment goals: Pt to return home with family.   The Patient and/or Patient Representative was provided with a Choice of Provider? Patient   The Patient and/Or Patient Representative agree with the Discharge Plan? Yes   Freedom of Choice list was provided with basic dialogue that supports the patient's individualized plan of care/goals, treatment preferences, and shares the quality data associated with the providers?  Yes

## 2024-02-12 NOTE — PLAN OF CARE
Problem: Pain  Goal: Verbalizes/displays adequate comfort level or baseline comfort level  Outcome: Progressing     Problem: Discharge Planning  Goal: Discharge to home or other facility with appropriate resources  Outcome: Progressing  Flowsheets (Taken 2/11/2024 2115)  Discharge to home or other facility with appropriate resources: Identify discharge learning needs (meds, wound care, etc)     Problem: ABCDS Injury Assessment  Goal: Absence of physical injury  Outcome: Progressing

## 2024-02-12 NOTE — DISCHARGE SUMMARY
Gynecology Discharge Summary     Name: Jack Jackson MRN: [id number]  SSN: [id number]    YOB: 2000  Age: 23 y.o.  Sex: female      Allergies: Patient has no known allergies.    Admit Date: 2/8/2024    Discharge Date: 2/12/2024      Admitting Physician: Markus Orlando MD     Discharge Physician: Markus Orlando MD     * Admission Diagnoses: Fibroid [D21.9]  Pelvic pain in female [R10.2]  Pelvic mass [R19.00]    * Discharge Diagnoses:      * Procedures: Laparotomy with right salpingo-oophorectomy    Consults: None    * Discharge Condition: Good    * Hospital Course:   Patient had attempted laparoscopy converted to laparotomy for large pelvic mass.  Procedure concluded right salpingo-oophorectomy.  Postop night 1 into 2 she spiked a fever to 102.9.  Blood cultures were negative white count was elevated.  She was started on broad-spectrum antibiotics.  The cultures that were taken from her pelvic mass at the time of the surgery came back haemophilus influenza.  Her antibiotics were adjusted for this.  She remained afebrile white count became normal.  She was discharged to home on p.o. antibiotics.  Pathology is pending.    * Discharge Disposition: Home    Discharge Medications:      Medication List        START taking these medications      cefdinir 300 MG capsule  Commonly known as: OMNICEF  Take 1 capsule by mouth 2 times daily for 7 days            CONTINUE taking these medications      ibuprofen 600 MG tablet  Commonly known as: ADVIL;MOTRIN  Take 1 tablet by mouth 3 times daily as needed for Pain               Where to Get Your Medications        These medications were sent to [id number] [address], [facility] - P [phone] - F [phone]  [address] [facility] [place], [address] [address]      Phone: [phone]   cefdinir 300 MG capsule          * Follow-up Care/Patient Instructions:  Activity: No sex, douching, or tampons for 6 weeks or as

## 2024-02-12 NOTE — PROGRESS NOTES
Gynecology Progress Note    Patient doing well post-op day 4 without significant complaints.  Pain controlled on current medication.  Voiding without difficulty. Patient is passing flatus.    Vitals:  Blood pressure 110/69, pulse 82, temperature 98.5 °F (36.9 °C), temperature source Oral, resp. rate 16, height 1.651 m (5' 5\"), weight 65.9 kg (145 lb 3.2 oz), last menstrual period 2024, SpO2 99 %, not currently breastfeeding.  Temp (24hrs), Av.5 °F (36.9 °C), Min:98.5 °F (36.9 °C), Max:98.5 °F (36.9 °C)        Exam:  Patient without distress.               Abdomen soft, appropriately tender.                 Incision dry and clean without erythema.               Lower extremities are negative for swelling, cords, or tenderness.    Lab/Data Review:  Culture came back as H. influenzae that is beta-lactamase positive    Assessment and Plan:  Patient appears to doing well.  Will plan on discharging her home today she has been afebrile for greater than 48 hours only had the 1 temperature spike.  Will send her home on 1 week of cefdinir.  She already has an appointment to see me this Thursday we will follow-up at that time.  Hopefully pathology will be back by that point.  She has prescription for pain medicine that she is already filled preoperatively

## 2024-02-12 NOTE — PROGRESS NOTES
Discharge instructions reviewed with Pt. Opportunity for questions and clarification given. IV removed and cath tip intact. Scripts sent to pharmacy. Education given to pt and pt was able to teach back. No needs at this time and pt waiting on ride to arrive.

## 2024-02-13 LAB
AMPICILLIN + SULBACTAM: NORMAL
AMPICILLIN: NORMAL
BACTERIA ISLT: ABNORMAL
BACTERIA SPEC CULT: ABNORMAL
BACTERIA SPEC CULT: ABNORMAL
CEFACLOR: NORMAL
CEFIXIME: NORMAL
CEFTRIAXONE: NORMAL
CEFUROXIME: NORMAL
CHLORAMPHENICOL: NORMAL
CLARITHROMYCIN: NORMAL
GRAM STN SPEC: ABNORMAL
GRAM STN SPEC: ABNORMAL
LEVOFLOXACIN: NORMAL
MEROPENEM: NORMAL
ORGANISM ID: ABNORMAL
ORGANISM ID: NORMAL
SERVICE CMNT-IMP: ABNORMAL
SPECIMEN SOURCE: ABNORMAL
SPECIMEN SOURCE: NORMAL
TETRACYCLINE: NORMAL
TRIMETHOPRIM + SULFAMETHOXAZOLE: NORMAL

## 2024-02-14 LAB
BACTERIA SPEC CULT: NORMAL
SERVICE CMNT-IMP: NORMAL

## 2024-02-15 ENCOUNTER — OFFICE VISIT (OUTPATIENT)
Dept: OBGYN CLINIC | Age: 24
End: 2024-02-15

## 2024-02-15 VITALS — HEIGHT: 65 IN | BODY MASS INDEX: 22.96 KG/M2 | WEIGHT: 137.8 LBS

## 2024-02-15 DIAGNOSIS — Z09 POSTOPERATIVE EXAMINATION: Primary | ICD-10-CM

## 2024-02-15 PROCEDURE — 99024 POSTOP FOLLOW-UP VISIT: CPT | Performed by: OBSTETRICS & GYNECOLOGY

## 2024-02-15 NOTE — PROGRESS NOTES
Jack presents for postop visit from Attempted Laparoscopy Diagnotic/ Right Salpingectomy and Resection of pelvic mass about 2 weeks ago.  Doing well postoperatively.without any bleeding.  Fever: No Voiding well: Yes.  Bowel movements OK: Yes.     Exam: A&OX3, NAD.  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 BENIGN FALLOPIAN TUBE     A/P.  Stable Post op condition.  Gradually increase activity. Resumption of sexual activity is not encouraged at this time.  Follow up 2 weeks.  She is interested in getting back home at approximately 4 weeks postop I do believe this will be reasonable would like to see her back before then.  Had extensive discussion with patient and her sister about the pathology report and that I had spoken with the pathologist who felt very sure that there was no concerning findings in the cyst walls.  I had spoken to the pathologist with giving him more clinical information and he felt that this was the best diagnosis that they would have.      Portions of this note were created using voice recognition software. Despite my efforts to edit grammatical and transcription errors, bizarre and nonsensical sentences may still exist.

## 2024-02-21 RX ORDER — GABAPENTIN 300 MG/1
300 CAPSULE ORAL 3 TIMES DAILY
Qty: 15 CAPSULE | Refills: 0 | Status: SHIPPED | OUTPATIENT
Start: 2024-02-21 | End: 2024-02-26

## 2024-02-22 NOTE — PROGRESS NOTES
Jack presents for postop visit from Attempted Laparoscopy Diagnotic/ Right Salpingectomy and Resection of pelvic mass about 3 weeks ago.  Doing well postoperatively.without any bleeding.  Fever: No Voiding well: Yes.  Bowel movements OK: Yes.     Taking Gabapentin once a day, symptoms have improved.     Exam: A&OX3, NAD.  Abdomen:  Non tender Incisions clean, dry, intact      A/P.  Stable Post op condition.  Gradually increase activity.   Follow up as needed.  She is moving back home and is interested in a refill of the gabapentin since she is concerned with increased activity and traveling this may be more of a problem.  I think it is reasonable to continue a short course if necessary.  I do believe she is healing well and will not need this long-term but she will follow-up with her primary care at home if she feels that is necessary.    Portions of this note were created using voice recognition software. Despite my efforts to edit grammatical and transcription errors, bizarre and nonsensical sentences may still exist.

## 2024-02-26 ENCOUNTER — OFFICE VISIT (OUTPATIENT)
Dept: OBGYN CLINIC | Age: 24
End: 2024-02-26

## 2024-02-26 VITALS — BODY MASS INDEX: 23.03 KG/M2 | WEIGHT: 138.2 LBS | HEIGHT: 65 IN

## 2024-02-26 DIAGNOSIS — Z09 POSTOPERATIVE EXAMINATION: Primary | ICD-10-CM

## 2024-02-26 PROCEDURE — 99024 POSTOP FOLLOW-UP VISIT: CPT | Performed by: OBSTETRICS & GYNECOLOGY

## 2024-02-26 RX ORDER — GABAPENTIN 300 MG/1
300 CAPSULE ORAL DAILY PRN
Qty: 5 CAPSULE | Refills: 0 | Status: SHIPPED | OUTPATIENT
Start: 2024-02-26 | End: 2024-03-11

## (undated) DEVICE — TROCAR: Brand: KII FIOS FIRST ENTRY

## (undated) DEVICE — SYRINGE IRRIG 60ML SFT PLIABLE BLB EZ TO GRP 1 HND USE W/

## (undated) DEVICE — PAD,NON-ADHERENT,3X8,STERILE,LF,1/PK: Brand: MEDLINE

## (undated) DEVICE — SEALER TISS L20CM DIA13MM ADV BPLR L CRV JAW OPN APPRCH

## (undated) DEVICE — Device

## (undated) DEVICE — GLOVE SURG SZ 75 CRM LTX FREE POLYISOPRENE POLYMER BEAD ANTI

## (undated) DEVICE — SPONGE LAPAROTOMY W18XL18IN WHITE STRUNG RADIOPAQUE STERILE

## (undated) DEVICE — SEALANT TISS 4 CC FIBRIN VISTASEAL

## (undated) DEVICE — GARMENT,MEDLINE,DVT,INT,CALF,MED, GEN2: Brand: MEDLINE

## (undated) DEVICE — INTENDED FOR TISSUE SEPARATION, AND OTHER PROCEDURES THAT REQUIRE A SHARP SURGICAL BLADE TO PUNCTURE OR CUT.: Brand: BARD-PARKER ® STAINLESS STEEL BLADES

## (undated) DEVICE — BLADE SURG NO10 C STL SHRP PREM

## (undated) DEVICE — PAD PT POS 36 IN SURGYPAD DISP

## (undated) DEVICE — SUTURE MCRYL SZ 4-0 L18IN ABSRB UD L19MM PS-2 3/8 CIR PRIM Y496G

## (undated) DEVICE — SHEARS ENDOSCP HARM 36CM ULTRASONIC CRV TIP UPGRD

## (undated) DEVICE — LIQUIBAND RAPID ADHESIVE 36/CS 0.8ML: Brand: MEDLINE

## (undated) DEVICE — TROCAR: Brand: KII® SLEEVE

## (undated) DEVICE — YANKAUER,BULB TIP,W/O VENT,RIGID,STERILE: Brand: MEDLINE

## (undated) DEVICE — SYRINGE MED 10ML LUERLOCK TIP W/O SFTY DISP

## (undated) DEVICE — GAUZE,SPONGE,4"X4",16PLY,STRL,LF,10/TRAY: Brand: MEDLINE

## (undated) DEVICE — SUTURE VCRL SZ 3-0 L36IN ABSRB UD L36MM CT-1 1/2 CIR J944H

## (undated) DEVICE — CANISTER, RIGID, 2000CC: Brand: MEDLINE INDUSTRIES, INC.

## (undated) DEVICE — SOLUTION IRRIG 3000ML 0.9% SOD CHL USP UROMATIC PLAS CONT

## (undated) DEVICE — PENCIL ES L3M BTTN SWCH HOLSTER W/ BLDE ELECTRD EDGE

## (undated) DEVICE — SOLUTION IRRIG 1000ML 0.9% SOD CHL USP POUR PLAS BTL

## (undated) DEVICE — SUTURE PDS II SZ 0 L27IN ABSRB VLT L36MM CT-1 1/2 CIR Z340H

## (undated) DEVICE — APPLICATOR MEDICATED 26 CC SOLUTION HI LT ORNG CHLORAPREP

## (undated) DEVICE — GLOVE SURG SZ 8 L12IN FNGR THK79MIL GRN LTX FREE

## (undated) DEVICE — TUBING, SUCTION, 1/4" X 10', STRAIGHT: Brand: MEDLINE

## (undated) DEVICE — TUBING INSUFFLATION SMK EVAC HI FLO SET PNEUMOCLEAR

## (undated) DEVICE — INSUFFLATION NEEDLE TO ESTABLISH PNEUMOPERITONEUM.: Brand: INSUFFLATION NEEDLE

## (undated) DEVICE — INJECTOR UTERINE MANIPULATOR